# Patient Record
(demographics unavailable — no encounter records)

---

## 2025-01-29 NOTE — DISCUSSION/SUMMARY
[FreeTextEntry1] : Symptomatic treatment of fever and/or pain discussed Stat strep test ordered Throat culture, if POSITIVE, give Duricef 1 gram BID x 10 days Covid test  Flu test OOS Hydrate well Handwashing and infection control discussed Return to office if febrile > 48 hours or if symptoms get worse Go to ER if unable to come to the office or during after hours, parent encouraged to call service first before doing so. Recheck prn

## 2025-01-29 NOTE — PHYSICAL EXAM
[Tired appearing] : tired appearing [Clear Rhinorrhea] : clear rhinorrhea [Erythematous Oropharynx] : erythematous oropharynx [Enlarged] : enlarged [Submandibular] : submandibular [Warm, Well Perfused x4] : warm, well perfused x4 [NL] : warm, clear [Acute Distress] : no acute distress [Conjuctival Injection] : no conjunctival injection [Discharge] : no discharge [Bulging] : not bulging [Erythema] : no erythema [Vesicles] : no vesicles [Exudate] : no exudate [Ulcerative Lesions] : no ulcerative lesions [Palate petechiae] : palate without petechiae [Wheezing] : no wheezing [Rales] : no rales [Crackles] : no crackles [Tachypnea] : no tachypnea [Rhonchi] : no rhonchi

## 2025-01-29 NOTE — HISTORY OF PRESENT ILLNESS
[de-identified] : Patient c/o fever, cough, and congestion x 1-2days. Took Tylenol Cold and Flu at 11:30am. [FreeTextEntry6] : Fever x 2 days Cough and runny nose x 2 days Brother also sick with same symptoms Fatigue and body aches No ear pain No sore throat No wheezing or dyspnea Normal appetite, No vomiting, No diarrhea No recent Covid contacts or exposure No recent travel or contact with travelers

## 2025-01-29 NOTE — REVIEW OF SYSTEMS
[Fever] : fever [Malaise] : malaise [Nasal Discharge] : nasal discharge [Cough] : cough [Negative] : Genitourinary [Eye Discharge] : no eye discharge [Eye Redness] : no eye redness [Ear Pain] : no ear pain [Sore Throat] : no sore throat [Cyanosis] : no cyanosis [Tachypnea] : not tachypneic [Wheezing] : no wheezing [Vomiting] : no vomiting [Diarrhea] : no diarrhea